# Patient Record
Sex: MALE
[De-identification: names, ages, dates, MRNs, and addresses within clinical notes are randomized per-mention and may not be internally consistent; named-entity substitution may affect disease eponyms.]

---

## 2020-03-24 ENCOUNTER — HOSPITAL ENCOUNTER (INPATIENT)
Dept: HOSPITAL 97 - ER | Age: 20
LOS: 4 days | Discharge: ANOTHER HOSPITAL | DRG: 871 | End: 2020-03-28
Attending: INTERNAL MEDICINE | Admitting: INTERNAL MEDICINE
Payer: SELF-PAY

## 2020-03-24 VITALS — BODY MASS INDEX: 28 KG/M2

## 2020-03-24 DIAGNOSIS — J18.9: ICD-10-CM

## 2020-03-24 DIAGNOSIS — D68.9: ICD-10-CM

## 2020-03-24 DIAGNOSIS — E88.09: ICD-10-CM

## 2020-03-24 DIAGNOSIS — Z20.828: ICD-10-CM

## 2020-03-24 DIAGNOSIS — E86.1: ICD-10-CM

## 2020-03-24 DIAGNOSIS — J96.01: ICD-10-CM

## 2020-03-24 DIAGNOSIS — A41.9: Primary | ICD-10-CM

## 2020-03-24 DIAGNOSIS — D69.6: ICD-10-CM

## 2020-03-24 DIAGNOSIS — E87.8: ICD-10-CM

## 2020-03-24 DIAGNOSIS — R65.20: ICD-10-CM

## 2020-03-24 DIAGNOSIS — E87.6: ICD-10-CM

## 2020-03-24 DIAGNOSIS — E87.1: ICD-10-CM

## 2020-03-24 DIAGNOSIS — R73.9: ICD-10-CM

## 2020-03-24 DIAGNOSIS — D72.810: ICD-10-CM

## 2020-03-24 LAB
ALBUMIN SERPL BCP-MCNC: 1.9 G/DL (ref 3.4–5)
ALP SERPL-CCNC: 145 U/L (ref 45–117)
ALT SERPL W P-5'-P-CCNC: 19 U/L (ref 12–78)
AST SERPL W P-5'-P-CCNC: 30 U/L (ref 15–37)
BUN BLD-MCNC: 19 MG/DL (ref 7–18)
GIANT PLATELETS BLD QL SMEAR: PRESENT
GLUCOSE SERPLBLD-MCNC: 145 MG/DL (ref 74–106)
HCT VFR BLD CALC: 38.9 % (ref 39.6–49)
LIPASE SERPL-CCNC: 52 U/L (ref 73–393)
LYMPHOCYTES # SPEC AUTO: 0.9 K/UL (ref 0.7–4.9)
MORPHOLOGY BLD-IMP: (no result)
PMV BLD: 13.6 FL (ref 7.6–11.3)
POTASSIUM SERPL-SCNC: 3 MMOL/L (ref 3.5–5.1)
RBC # BLD: 4.57 M/UL (ref 4.33–5.43)
TOXIC GRANULES BLD QL SMEAR: (no result)

## 2020-03-24 PROCEDURE — 82248 BILIRUBIN DIRECT: CPT

## 2020-03-24 PROCEDURE — 85610 PROTHROMBIN TIME: CPT

## 2020-03-24 PROCEDURE — 80307 DRUG TEST PRSMV CHEM ANLYZR: CPT

## 2020-03-24 PROCEDURE — 93307 TTE W/O DOPPLER COMPLETE: CPT

## 2020-03-24 PROCEDURE — 94640 AIRWAY INHALATION TREATMENT: CPT

## 2020-03-24 PROCEDURE — 81001 URINALYSIS AUTO W/SCOPE: CPT

## 2020-03-24 PROCEDURE — 85044 MANUAL RETICULOCYTE COUNT: CPT

## 2020-03-24 PROCEDURE — 83615 LACTATE (LD) (LDH) ENZYME: CPT

## 2020-03-24 PROCEDURE — 71045 X-RAY EXAM CHEST 1 VIEW: CPT

## 2020-03-24 PROCEDURE — 96374 THER/PROPH/DIAG INJ IV PUSH: CPT

## 2020-03-24 PROCEDURE — 87804 INFLUENZA ASSAY W/OPTIC: CPT

## 2020-03-24 PROCEDURE — 80061 LIPID PANEL: CPT

## 2020-03-24 PROCEDURE — 83690 ASSAY OF LIPASE: CPT

## 2020-03-24 PROCEDURE — 84145 PROCALCITONIN (PCT): CPT

## 2020-03-24 PROCEDURE — 85379 FIBRIN DEGRADATION QUANT: CPT

## 2020-03-24 PROCEDURE — 36415 COLL VENOUS BLD VENIPUNCTURE: CPT

## 2020-03-24 PROCEDURE — 83735 ASSAY OF MAGNESIUM: CPT

## 2020-03-24 PROCEDURE — 87070 CULTURE OTHR SPECIMN AEROBIC: CPT

## 2020-03-24 PROCEDURE — 85730 THROMBOPLASTIN TIME PARTIAL: CPT

## 2020-03-24 PROCEDURE — 71275 CT ANGIOGRAPHY CHEST: CPT

## 2020-03-24 PROCEDURE — 87389 HIV-1 AG W/HIV-1&-2 AB AG IA: CPT

## 2020-03-24 PROCEDURE — 85025 COMPLETE CBC W/AUTO DIFF WBC: CPT

## 2020-03-24 PROCEDURE — 87086 URINE CULTURE/COLONY COUNT: CPT

## 2020-03-24 PROCEDURE — 80053 COMPREHEN METABOLIC PANEL: CPT

## 2020-03-24 PROCEDURE — 87088 URINE BACTERIA CULTURE: CPT

## 2020-03-24 PROCEDURE — 87205 SMEAR GRAM STAIN: CPT

## 2020-03-24 PROCEDURE — 96361 HYDRATE IV INFUSION ADD-ON: CPT

## 2020-03-24 PROCEDURE — 87081 CULTURE SCREEN ONLY: CPT

## 2020-03-24 PROCEDURE — 94660 CPAP INITIATION&MGMT: CPT

## 2020-03-24 PROCEDURE — 84132 ASSAY OF SERUM POTASSIUM: CPT

## 2020-03-24 PROCEDURE — 94760 N-INVAS EAR/PLS OXIMETRY 1: CPT

## 2020-03-24 PROCEDURE — 83605 ASSAY OF LACTIC ACID: CPT

## 2020-03-24 PROCEDURE — 74177 CT ABD & PELVIS W/CONTRAST: CPT

## 2020-03-24 PROCEDURE — 71260 CT THORAX DX C+: CPT

## 2020-03-24 PROCEDURE — 99285 EMERGENCY DEPT VISIT HI MDM: CPT

## 2020-03-24 PROCEDURE — 87040 BLOOD CULTURE FOR BACTERIA: CPT

## 2020-03-24 PROCEDURE — 85384 FIBRINOGEN ACTIVITY: CPT

## 2020-03-24 PROCEDURE — 80202 ASSAY OF VANCOMYCIN: CPT

## 2020-03-24 RX ADMIN — SODIUM CHLORIDE SCH MLS: 0.9 INJECTION, SOLUTION INTRAVENOUS at 18:58

## 2020-03-24 RX ADMIN — ACETAMINOPHEN PRN MG: 325 TABLET ORAL at 23:45

## 2020-03-24 RX ADMIN — GUAIFENESIN AND CODEINE PHOSPHATE PRN ML: 100; 10 SOLUTION ORAL at 23:21

## 2020-03-24 NOTE — RAD REPORT
EXAM DESCRIPTION:  Terra Single View3/24/2020 12:57 pm

 

CLINICAL HISTORY:  Chest pain

 

COMPARISON:  none

 

FINDINGS:   Mild to moderate bilateral patchy lung opacities are present.

 

The heart is normal size

 

IMPRESSION:  Mild to moderate bilateral patchy lung opacities may indicate a viral pneumonia.

## 2020-03-24 NOTE — P.HP
Patient History


Date of Service: 03/24/20


Reason for admission: Shortness of breath


History of Present Illness: 





Mr Grimes is 20-year-old male who presented to the ER with complaints of 

shortness of breath and cough.  Patient reported that he tested influenza B 

positive 7 days ago.  His symptoms started 7 days ago with subjective fever, 

nonproductive cough, nausea, vomiting and diarrhea.  He denied any abdominal 

pain. Patient progressively worsened and unable to tolerate p.o. intake.


He denies any sick contacts or contact with COVID19+ person nor recent travel.


Up on presentation to the hospital, patient was tachypneic and tachycardic; he 

has been resuscitated appropriately and currently in no obvious acute distress.


Allergies





No Known Allergies Allergy (Unverified 03/24/20 18:45)


 





Home Medications: 








NK [No Home Meds]  03/24/20 








- Past Medical/Surgical History


Has patient received pneumonia vaccine in the past: No


Diabetic: No


Past Medical History: Patient denies medical history


Past Surgical History: Patient denies surgical history





- Social History


Smoking Status: Never smoker


Smoking therapy provided: No


Alcohol use: No


CD- Drugs: No


Caffeine use: No





Review of Systems


General: Fever, Chills, Sweats, Weakness, Malaise


Respiratory: Cough, Shortness of Breath, SOB with Excertion, Pleuritic Pain, 

Wheezing


Cardiovascular: Chest Pain


Gastrointestinal: Nausea, Vomiting, Diarrhea


Genitourinary: Unremarkable


Musculoskeletal: Unremarkable


Integumentary: Unremarkable


Neurological: Unremarkable


Lymphatics: Unremarkable





Physical Examination





- Physical Exam


General: Alert, In no apparent distress


HEENT: Atraumatic, PERRLA, Mucous membr. moist/pink, EOMI, Sclerae nonicteric


Neck: Supple, 2+ carotid pulse no bruit, No LAD, Without JVD or thyroid 

abnormality


Respiratory: Diminished, Crackles/rales


Cardiovascular: Regular rate/rhythm, Normal S1 S2


Gastrointestinal: Normal bowel sounds, No tenderness


Musculoskeletal: No tenderness


Integumentary: No rashes


Neurological: Normal gait, Normal speech, Normal strength at 5/5 x4 extr, 

Normal tone, Normal affect


Lymphatics: No axilla or inguinal lymphadenopathy





- Studies


Laboratory Data (last 24 hrs)





03/24/20 12:30: Sodium 133 L, Potassium 3.0 L, BUN 19 H, Creatinine 1.06, 

Glucose 145 H, Total Bilirubin 2.3 H, AST 30, ALT 19, Alkaline Phosphatase 145 H

, Lipase 52 L


03/24/20 12:30: WBC 23.9 H*, Hgb 13.3 L, Hct 38.9 L, Plt Count 33 L*





Microbiology Data (last 24 hrs): 








03/24/20 13:26   Throat   Group A Streptococcus Rapid Screen - Final


03/24/20 13:26   Nasopharnyx   Influenza Type A Antigen Screen - Final


03/24/20 13:26   Nasopharnyx   Influenza Type B Antigen Screen - Final








Assessment and Plan


Discharge Plan: Home





- Advance Directives


Does patient have a Living Will: No


Does patient have a Durable POA for Healthcare: No


Physician Review: Patient Assessed, Agree with Above Assessment and Plan


Physician Review Additional Text: 


Mr. Grimes is 21 y/o male pw cough and sob.


#Severe sepsis 2/2 to viral pneumonia- POA.  patient meeting severe sepsis 

criteria. Maintaining BP and MAP > 65


- Tbili & procal elevated. Lactic acid wnl. 


- IV hydration. Maintain perfusion


-supportive measures. 


#Viral pneumonia- CXR with bilateral infiltrates consistent with viral 

pneumonia. He was hypoxic and in resp distress on presentation. Also with GI 

symptoms, all consistent with viral etiology.


-COVID19 suspected, patient in isolation.  Swab has been sent.


-Influenza negative.


-supportive measures have been initiated.


-consult Infectious Disease and pulmonologist for assistance.


#Shortness of breath-on nasal cannula with saturations greater than 90% 


-low threshold for intubation.  


-monitor closely.


-serial CXR


#Thrombocytopenia and lymphopenia-all consistent with viral etiology.


-supportive measures and monitor for bleeding.


#Electrolyte imbalance-hypokalemia and hypo natremia secondary to hypovolemia.


-will replace.  


-monitor closely with daily labs.


#Hyperglycemia-secondary to sepsis.  Patient denies prior history of diabetes.


#Vomiting and diarrhea-last episode was yesterday.  Seems to have resolved.


-clear liquid diet until tolerating well. 


DVT ppx- SCD


Patient is full code. 


Dispo- inpatient

## 2020-03-24 NOTE — EDPHYS
Physician Documentation                                                                           

 [facility]                                                                 

Name: Armando Grimes                                                                                   

Age: 20 yrs                                                                                       

Sex: Male                                                                                         

: 2000                                                                                   

MRN: [id number]                                                                                   

Arrival Date: 2020                                                                          

Time: 12:32                                                                                       

Account#: [id number]                                                                            

Bed 16                                                                                            

Private MD:                                                                                       

ED Physician Rios Carroll                                                                    

HPI:                                                                                              

                                                                                             

15:03 This 20 yrs old  Male presents to ER via EMS with complaints of cough.          ma2 

15:03 he states he tested positive for flu B 7 days ago, he is here with cough and tachypnea  ma2 

      and tachycardia x 3 days . Onset: The symptoms/episode began/occurred gradually, 1          

      week(s) ago. Severity of symptoms: At their worst the symptoms were moderate in the         

      emergency department the symptoms are unchanged.                                            

                                                                                                  

Historical:                                                                                       

- Allergies:                                                                                      

12:39 No Known Allergies;                                                                     wh  

- Home Meds:                                                                                      

12:39 None [Active];                                                                          wh  

- PMHx:                                                                                           

12:39 None;                                                                                   wh  

- PSHx:                                                                                           

12:39 None;                                                                                   wh  

                                                                                                  

- Immunization history:: Adult Immunizations not up to date.                                      

- Social history:: Smoking status: Patient/guardian denies using Patient/guardian                 

  denies using alcohol, street drugs, The patient lives with family.                              

- Family history:: not pertinent.                                                                 

                                                                                                  

                                                                                                  

ROS:                                                                                              

15:03 Constitutional: Negative for fever, chills, and weight loss.                            ma2 

15:03 All other systems are negative.                                                             

                                                                                                  

Exam:                                                                                             

15:03 Constitutional:  This is a well developed, well nourished patient who is awake, alert,  ma2 

      and in no acute distress. Head/Face:  Normocephalic, atraumatic. Eyes:  Pupils equal        

      round and reactive to light, extra-ocular motions intact.  Lids and lashes normal.          

      Conjunctiva and sclera are non-icteric and not injected.  Cornea within normal limits.      

      Periorbital areas with no swelling, redness, or edema. ENT:  Nares patent. No nasal         

      discharge, no septal abnormalities noted.  Tympanic membranes are normal and external       

      auditory canals are clear.  Oropharynx with no redness, swelling, or masses, exudates,      

      or evidence of obstruction, uvula midline.  Mucous membranes moist. Neck:  Trachea          

      midline, no thyromegaly or masses palpated, and no cervical lymphadenopathy.  Supple,       

      full range of motion without nuchal rigidity, or vertebral point tenderness.  No            

      Meningismus. Chest/axilla:  Normal chest wall appearance and motion.  Nontender with no     

      deformity.  No lesions are appreciated. Cardiovascular:  Regular rate and rhythm with a     

      normal S1 and S2.  No gallops, murmurs, or rubs.  Normal PMI, no JVD.  No pulse             

      deficits. Abdomen/GI:  Soft, non-tender, with normal bowel sounds.  No distension or        

      tympany.  No guarding or rebound.  No evidence of tenderness throughout. Male :           

      Normal genitalia with no discharge or lesions. Skin:  Warm, dry with normal turgor.         

      Normal color with no rashes, no lesions, and no evidence of cellulitis. MS/ Extremity:      

      Pulses equal, no cyanosis.  Neurovascular intact.  Full, normal range of motion. Neuro:     

       Awake and alert, GCS 15, oriented to person, place, time, and situation.  Cranial          

      nerves II-XII grossly intact.  Motor strength 5/5 in all extremities.  Sensory grossly      

      intact.  Cerebellar exam normal.  Normal gait.                                              

15:03 Respiratory: moderate respiratory distress is noted,  Respirations: labored breathing,      

      Breath sounds: bronchial sounds, Respiratory rate:  40                                      

                                                                                                  

Vital Signs:                                                                                      

12:36  / 69; Pulse 142; Resp 32; Temp 101; Pulse Ox 95% ; Weight 72.57 kg; Height 5 ft.   

      7 in. (170.18 cm);                                                                          

13:00 BP 98 / 55; Pulse 119; Resp 34; Pulse Ox 95% on R/A;                                      

14:00  / 73; Pulse 111; Resp 41; Pulse Ox 97% on R/A;                                     

15:00  / 69; Pulse 105; Resp 36; Temp 98.3; Pulse Ox 97% ;                                

16:00  / 66; Pulse 106; Resp 35; Pulse Ox 98% ;                                         ll1 

16:55  / 69; Pulse 106; Resp 34; Pulse Ox 100% on 2 lpm NC;                             ll1 

17:25  / 72; Pulse 100; Resp 30; Pulse Ox 99% on 2 lpm NC; Pain 0/10;                   ll1 

18:20  / 62; Pulse 105; Resp 30; Temp 97.0; Pulse Ox 98% on 2 lpm NC; Pain 0/10;        ll1 

12:36 Body Mass Index 25.06 (72.57 kg, 170.18 cm)                                               

                                                                                                  

MDM:                                                                                              

12:35 Patient medically screened.                                                             ma2 

15:03 Differential Diagnosis altered mental status, sepsis, flu. Differential Diagnosis       ma2 

      altered mental status, OVID 19 vs flu vs pneumonia . Data reviewed: vital signs, nurses     

      notes. Counseling: I had a detailed discussion with the patient and/or guardian             

      regarding: the historical points, exam findings, and any diagnostic results supporting      

      the discharge/admit diagnosis, the presence of at least one elevated blood pressure         

      reading (>120/80) during this emergency department visit, the need for further work-up      

      and treatment in the hospital. Response to treatment: the patient's symptoms have           

      markedly improved after treatment.                                                          

                                                                                                  

                                                                                             

12:34 Order name: CBC with Diff; Complete Time: 14:10                                         [facility] 

                                                                                             

12:34 Order name: CMP; Complete Time: 13:09                                                   [facility] 

                                                                                             

12:34 Order name: Lipase; Complete Time: 13:09                                                [facility] 

                                                                                             

13:13 Order name: Procalcitonin; Complete Time: 14:43                                         [facility] 

                                                                                             

13:13 Order name: Blood Culture Adult (2)                                                     [facility] 

                                                                                             

13:13 Order name: Flu; Complete Time: 14:59                                                   [facility] 

                                                                                             

13:13 Order name: Strep; Complete Time: 14:59                                                 [facility] 

                                                                                             

13:50 Order name: Misc. Lab Test                                                              ss  

                                                                                             

13:54 Order name: Lactate: recollect lactate; Complete Time: 14:42                            bd  

                                                                                             

14:04 Order name: Manual Differential; Complete Time: 14:10                                   [facility]

                                                                                             

15:29 Order name: Throat Culture                                                              [facility]

                                                                                             

15:51 Order name: CBC with Automated Diff                                                     [facility]

                                                                                             

15:51 Order name: CBC with Automated Diff                                                     [facility]

                                                                                             

12:34 Order name: Chest Single View XRAY; Complete Time: 13:29                                [facility] 

                                                                                             

15:51 Order name: CBC with Automated Diff                                                     [facility]

                                                                                             

15:51 Order name: CBC with Automated Diff                                                     [facility]

                                                                                             

15:51 Order name: Comprehensive Metabolic Panel                                               [facility]

                                                                                             

15:51 Order name: Comprehensive Metabolic Panel                                               [facility]

                                                                                             

15:51 Order name: Comprehensive Metabolic Panel                                               [facility]

                                                                                             

15:51 Order name: Comprehensive Metabolic Panel                                               [facility]

                                                                                             

15:51 Order name: Lipid Profile                                                               [facility]

                                                                                             

15:51 Order name: Lipid Profile                                                               [facility]

                                                                                             

15:51 Order name: CONS Pharmacy Consult                                                       [facility]

                                                                                             

16:50 Order name: Clear Liquid                                                                [facility]

                                                                                                  

Administered Medications:                                                                         

13:20 Drug: NS 0.9% 2000 ml Route: IV; Rate: 1 bolus; Site: left antecubital;                   

14:00 Follow up: Response: No adverse reaction; IV Status: Completed infusion; IV Intake:     ll1 

      2000ml                                                                                      

13:30 CANCELLED (Patient ): AZITHromycin 500 mg PO once                                ma2 

13:30 Drug: TORadol 30 mg Route: IVP; Site: left antecubital;                                   

13:30 Drug: AZITHromycin 500 mg Route: PO;                                                      

18:34 Follow up: Response: No adverse reaction; RASS: Alert and Calm (0)                      ll1 

13:50 Not Given (Other Intervention Used): covid 1 application PO bolus                       ss  

15:01 Not Given (Physician Discretion): AZITHromycin 500 mg IVPB once over 1 hrs; (mix in 250 wh  

      mL NS)                                                                                      

15:33 Drug: Tylenol 1000 mg Route: PO;                                                        ll1 

18:35 Follow up: Response: No adverse reaction; Temperature is decreased; Pain is decreased;  ll1 

      RASS: Alert and Calm (0)                                                                    

                                                                                                  

                                                                                                  

Disposition:                                                                                      

20 15:06 Hospitalization ordered by Donnell Gill for Inpatient Admission. Preliminary     

  diagnosis are Severe sepsis without septic shock, Viral pneumonia, not                          

  elsewhere classified.                                                                           

- Bed requested for Telemetry/MedSurg (Inpatient).                                                

- Status is Inpatient Admission.                                                              ll1 

- Condition is Fair.                                                                              

- Problem is new.                                                                                 

- Symptoms are unchanged.                                                                         

                                                                                                  

                                                                                                  

                                                                                                  

Signatures:                                                                                       

Dispatcher [employer]                           EDMS                                                 

Lisa Benitez                                                                                 

Alma Glass                                                                                   

Rios Carroll MD MD   ma2                                                  

Demetrius Umanzor RN                       RN   1                                                  

Unique Jesus RN   ss                                                   

                                                                                                  

Corrections: (The following items were deleted from the chart)                                    

13:30 12:34 AZITHromycin 500 mg PO once ordered. ma2                                          ma2 

16:49 15:51 Regular ordered. EDMS                                                             [facility]

17:01 13:13 LACTATE+C.LAB.BRZ ordered. EDMS                                                   [facility]

17:12 15:06 Hospitalization Ordered by Donnell Gill MD for Inpatient Admission. Preliminary  bd  

      diagnosis is Severe sepsis without septic shock; Viral pneumonia, not elsewhere             

      classified. Bed requested for Telemetry/MedSurg (Inpatient). Status is Inpatient            

      Admission. Condition is Fair. Problem is new. Symptoms are unchanged. ma2                   

18:33 17:12 2020 15:06 Hospitalization Ordered by Donnell Gill MD for Inpatient        ll1 

      Admission. Preliminary diagnosis is Severe sepsis without septic shock; Viral               

      pneumonia, not elsewhere classified. Bed requested for Telemetry/MedSurg (Inpatient).       

      Status is Inpatient Admission. Condition is Fair. Problem is new. Symptoms are              

      unchanged. bd                                                                               

                                                                                                  

**************************************************************************************************

## 2020-03-24 NOTE — ER
Nurse's Notes                                                                                     

 [facility]                                                                 

Name: Armando Grimes                                                                                   

Age: 20 yrs                                                                                       

Sex: Male                                                                                         

: 2000                                                                                   

MRN: [id number]                                                                                   

Arrival Date: 2020                                                                          

Time: 12:32                                                                                       

Account#: [id number]                                                                            

Bed 16                                                                                            

Private MD:                                                                                       

Diagnosis: Severe sepsis without septic shock;Viral pneumonia, not elsewhere classified           

                                                                                                  

Presentation:                                                                                     

                                                                                             

12:30 Onset of symptoms is unknown.                                                             

12:36 Chief complaint: EMS states: Pt was diagnosed with the flu a week ago. Pt only taking   wh  

      Nyquil states he is not getting better. Pt fever 102.3 and Tachycardic at 145. Pt           

      states was tested for [facility] in [facility] [place]. Coronavirus screen:       

      Patient reports a subjective fever or greater than 100.4F, or cough, or shortness of        

      breath, or difficulty breathing. Surgical mask placed on patient. Patient moved to          

      private room, placed in contact and droplet isolation with eye protection until further     

      assessment. Patient denies travel on a cruise ship or to a country the [facility] currently        

      lists as an affected area. Patient denies contact with known and/or suspected case of       

      COVID-19. Ebola Screen: Patient negative for fever greater than or equal to 101.5           

      degrees Fahrenheit, and additional compatible Ebola Virus Disease symptoms Patient          

      denies exposure to infectious person. Initial Sepsis Screen: Does the patient meet any      

      2 criteria? RR > 20 per min. Temp <36.0*C (96.8*F)) or > 38.3*C (100.9*F). HR > 90 bpm.     

      Does the patient have a suspected source of infection? Yes: Other: Flu If YES to both,      

      name of provider notified: Rios Carroll MD. Risk Assessment: Do you want to hurt        

      yourself or someone else? Patient reports no desire to harm self or others.                 

12:36 Method Of Arrival: EMS: [facility]                                                  

12:36 Acuity: SURENDRA 3                                                                             

                                                                                                  

Historical:                                                                                       

- Allergies:                                                                                      

12:39 No Known Allergies;                                                                       

- Home Meds:                                                                                      

12:39 None [Active];                                                                            

- PMHx:                                                                                           

12:39 None;                                                                                     

- PSHx:                                                                                           

12:39 None;                                                                                     

                                                                                                  

- Immunization history:: Adult Immunizations not up to date.                                      

- Social history:: Smoking status: Patient/guardian denies using Patient/guardian                 

  denies using alcohol, street drugs, The patient lives with family.                              

- Family history:: not pertinent.                                                                 

                                                                                                  

                                                                                                  

Screenin:39 Abuse screen: Denies threats or abuse. Denies injuries from another. Nutritional          

      screening: No deficits noted. Tuberculosis screening: No symptoms or risk factors           

      identified. Fall Risk None identified.                                                      

                                                                                                  

Assessment:                                                                                       

12:45 Reassessment: Pt flagged for Sepsis , RR 32, Temp 101, MD at bedside, notified of   

      Sepsis Protocol, MD stated no need for Septic Work up now.                                  

13:00 General: Appears in no apparent distress. Behavior is calm, cooperative, appropriate    wh  

      for age. Pain: Denies pain. Neuro: Level of Consciousness is awake, alert, obeys            

      commands, Oriented to person, place, time, situation, Appropriate for age.                  

      Cardiovascular: Heart tones S1 S2. Respiratory: Reports shortness of breath cough that      

      is Airway is patent Respiratory effort is even, labored, shallow, Respiratory pattern       

      is tachypnea Breath sounds are diminished bilaterally. GI: Abdomen is flat,                 

      non-distended. : No signs and/or symptoms were reported regarding the genitourinary       

      system. EENT: Throat is pink. Derm: Skin is intact, is healthy with good turgor, Skin       

      is pink, warm \T\ dry. normal. Musculoskeletal: Circulation, motion, and sensation intact.  

13:10 Reassessment: Spoke with MD septic work up was ordered, Pt still tachypneic.              

14:10 Reassessment: Pt still tachypneic, shallow breaths at 44, lung sounds diminished, spoke wh  

      with MD, assessment done with order to put Pt on 2LNC.                                      

15:19 Reassessment: No changes from previously documented assessment. Patient and/or family   ll1 

      updated on plan of care and expected duration. Pain level reassessed. Patient is alert,     

      oriented x 3, equal unlabored respirations, skin warm/dry/pink. To bedside commode for      

      BM. Call light within reach..                                                               

16:15 Reassessment: No changes from previously documented assessment. Patient and/or family   ll1 

      updated on plan of care and expected duration. Pain level reassessed. Patient is alert,     

      oriented x 3, equal unlabored respirations, skin warm/dry/pink.                             

16:33 Reassessment: PUI [id number].                                                              

17:15 Reassessment: No changes from previously documented assessment. Patient and/or family   ll1 

      updated on plan of care and expected duration. Pain level reassessed. Patient is alert,     

      oriented x 3, equal unlabored respirations, skin warm/dry/pink.                             

                                                                                                  

Vital Signs:                                                                                      

12:36  / 69; Pulse 142; Resp 32; Temp 101; Pulse Ox 95% ; Weight 72.57 kg; Height 5 ft.   

      7 in. (170.18 cm);                                                                          

13:00 BP 98 / 55; Pulse 119; Resp 34; Pulse Ox 95% on R/A;                                      

14:00  / 73; Pulse 111; Resp 41; Pulse Ox 97% on R/A;                                     

15:00  / 69; Pulse 105; Resp 36; Temp 98.3; Pulse Ox 97% ;                                

16:00  / 66; Pulse 106; Resp 35; Pulse Ox 98% ;                                         ll1 

16:55  / 69; Pulse 106; Resp 34; Pulse Ox 100% on 2 lpm NC;                             ll1 

17:25  / 72; Pulse 100; Resp 30; Pulse Ox 99% on 2 lpm NC; Pain 0/10;                   ll1 

18:20  / 62; Pulse 105; Resp 30; Temp 97.0; Pulse Ox 98% on 2 lpm NC; Pain 0/10;        ll1 

12:36 Body Mass Index 25.06 (72.57 kg, 170.18 cm)                                               

                                                                                                  

ED Course:                                                                                        

12:30 Maintain EMS IV. Dressing intact. Good blood return noted. Site clean \T\ dry. Gauge \T\    kai
3

      site: 20-gauge in RAC. Patient maintains SpO2 saturation greater than 95% on room air.      

12:30 Initial lab(s) drawn, by me, sent to lab.                                               jp3 

12:30 Patient has correct armband on for positive identification. Bed in low position. Call   jp3 

      light in reach. Cool cloth applied. Verbal reassurance given. Pulse ox on. NIBP on.         

12:30 Arm band placed on right wrist.                                                           

12:32 Patient arrived in ED.                                                                    

12:33 Rios Carroll MD is Attending Physician.                                           ma2 

12:35 Alma Glass is Primary Nurse.                                                           

12:38 Triage completed.                                                                       wh  

12:43 Lipase Sent.                                                                            jp3 

12:44 CMP Sent.                                                                               jp3 

12:44 CBC with Diff Sent.                                                                     jp3 

12:57 Chest Single View XRAY In Process Unspecified.                                          EDMS

13:20 Inserted saline lock: 20 gauge in left antecubital area, using aseptic technique. Blood wh  

      collected.                                                                                  

15:05 Donnell Gill MD is Hospitalizing Provider.                                           ma2 

17:35 No provider procedures requiring assistance completed. Patient admitted, IV remains in  ll1 

      place.                                                                                      

                                                                                                  

Administered Medications:                                                                         

13:20 Drug: NS 0.9% 2000 ml Route: IV; Rate: 1 bolus; Site: left antecubital;                   

14:00 Follow up: Response: No adverse reaction; IV Status: Completed infusion; IV Intake:     ll1 

      2000ml                                                                                      

13:30 CANCELLED (Patient ): AZITHromycin 500 mg PO once                                ma2 

13:30 Drug: TORadol 30 mg Route: IVP; Site: left antecubital;                                   

13:30 Drug: AZITHromycin 500 mg Route: PO;                                                      

18:34 Follow up: Response: No adverse reaction; RASS: Alert and Calm (0)                      [facility] 

13:50 Not Given (Other Intervention Used): covid 1 application PO bolus                         

15:01 Not Given (Physician Discretion): AZITHromycin 500 mg IVPB once over 1 hrs; (mix in 250 wh  

      mL NS)                                                                                      

15:33 Drug: Tylenol 1000 mg Route: PO;                                                        1 

18:35 Follow up: Response: No adverse reaction; Temperature is decreased; Pain is decreased;  1 

      RASS: Alert and Calm (0)                                                                    

                                                                                                  

                                                                                                  

Intake:                                                                                           

14:00 IV: 2000ml; Total: 2000ml.                                                              1 

                                                                                                  

Outcome:                                                                                          

15:06 Decision to Hospitalize by Provider.                                                    ma2 

17:33 Admitted to Tele accompanied by tech, via stretcher, room Room 417, Report called to    [facility] 

      Jaylene CABELLO                                                                                     

17:33 Condition: improved                                                                         

17:33 Instructed on the need for admit.                                                           

18:33 Patient left the ED.                                                                    1 

                                                                                                  

Signatures:                                                                                       

Dispatcher [employer]                           EDMS                                                 

Unique Jesus RN                      RN                                                      

Alma Glass                                                                                   

Rios Carroll MD MD   ma2                                                  

Jhonatan Park                              3                                                  

Demetrius Umanzor RN                       RN   1                                                  

                                                                                                  

Corrections: (The following items were deleted from the chart)                                    

14:58 12:36  / 69; Pulse 142bpm; Resp 22bpm; Pulse Ox 95%; Temp 101F; 72.57 kg; Height    

      5 ft. 7 in.; BMI: 25.0; wh                                                                  

14:58 12:33 Chief complaint: [facility]  

                                                                                                  

**************************************************************************************************

## 2020-03-25 LAB
ALBUMIN SERPL BCP-MCNC: 1.7 G/DL (ref 3.4–5)
ALP SERPL-CCNC: 115 U/L (ref 45–117)
ALT SERPL W P-5'-P-CCNC: 18 U/L (ref 12–78)
AST SERPL W P-5'-P-CCNC: 31 U/L (ref 15–37)
BUN BLD-MCNC: 18 MG/DL (ref 7–18)
GLUCOSE SERPLBLD-MCNC: 109 MG/DL (ref 74–106)
HCT VFR BLD CALC: 33.3 % (ref 39.6–49)
HDLC SERPL-MCNC: 11 MG/DL (ref 40–60)
INR BLD: 1.77
LDLC SERPL CALC-MCNC: 13 MG/DL (ref ?–130)
LYMPHOCYTES # SPEC AUTO: 0.8 K/UL (ref 0.7–4.9)
PMV BLD: 13.7 FL (ref 7.6–11.3)
POTASSIUM SERPL-SCNC: 3.3 MMOL/L (ref 3.5–5.1)
RBC # BLD: 3.89 M/UL (ref 4.33–5.43)
UA COMPLETE W REFLEX CULTURE PNL UR: (no result)

## 2020-03-25 RX ADMIN — SODIUM CHLORIDE SCH: 0.9 INJECTION, SOLUTION INTRAVENOUS at 13:00

## 2020-03-25 RX ADMIN — SODIUM CHLORIDE SCH MLS: 0.9 INJECTION, SOLUTION INTRAVENOUS at 04:49

## 2020-03-25 RX ADMIN — CEFTRIAXONE SCH MLS: 1 INJECTION, SOLUTION INTRAVENOUS at 08:36

## 2020-03-25 RX ADMIN — CEFTRIAXONE SCH MLS: 1 INJECTION, SOLUTION INTRAVENOUS at 20:30

## 2020-03-25 RX ADMIN — ACETAMINOPHEN SCH MG: 325 TABLET ORAL at 20:30

## 2020-03-25 RX ADMIN — GUAIFENESIN AND CODEINE PHOSPHATE PRN ML: 100; 10 SOLUTION ORAL at 08:50

## 2020-03-25 RX ADMIN — TRAMADOL HYDROCHLORIDE PRN MG: 50 TABLET, COATED ORAL at 10:45

## 2020-03-25 RX ADMIN — ACETAMINOPHEN PRN MG: 325 TABLET ORAL at 04:49

## 2020-03-25 RX ADMIN — TRAMADOL HYDROCHLORIDE PRN MG: 50 TABLET, COATED ORAL at 20:48

## 2020-03-25 RX ADMIN — SODIUM CHLORIDE SCH MLS: 9 INJECTION, SOLUTION INTRAVENOUS at 16:06

## 2020-03-25 RX ADMIN — SODIUM CHLORIDE SCH MLS: 0.9 INJECTION, SOLUTION INTRAVENOUS at 08:37

## 2020-03-25 RX ADMIN — ACETAMINOPHEN PRN MG: 325 TABLET ORAL at 16:05

## 2020-03-25 NOTE — CON
History Of Present Illness:  This is a 20-year-old male who has been having problem with breathing fo
r last 2 weeks, coming in with severe shortness of breath, shivers, chills, and fevers.  Patient has 
a strep.  Patient has gram-positive cocci positive in his blood cultures.  Patient continued to have 
productive cough with yellowish sputum and fever as high as 102.  The patient also has nausea and vom
iting.  Denies any other problems at this time except for chest pain especially when he takes deep br
eaths.



Past Medical History:  None.



Family History:  Noncontributory.



Medications:  Zithromax, Rocephin, and vancomycin has been recently added.  See MAR for other medicat
ions.



Allergies:  NO KNOWN DRUG ALLERGIES.



Review of Systems:

A 10-point review was performed.



Physical Examination:

General:  This is a 20-year-old male, lying in bed with mild respiratory distress. 

Vital Signs:  T-max of 102, right now 100.2; heart rate of 115; respirations 36; and blood pressure 1
24/66. 

HEENT:  Unremarkable. 

Neck:  Supple. 

Lungs:  Basal crackles and scattered rhonchi on the left side. 

Heart:  S1, S2.  Regular. 

Abdomen:  Soft, nontender.  Bowel sounds present. 

Extremities:  No edema __________ muscle tone.



Laboratory Data:  Shows WBC 21,900, hemoglobin 11.3, platelets 43.  Sodium 136, potassium 3.3, chlori
de 105, bicarb 23, BUN 18, creatinine 0.8, glucose is 109, albumin is 1.7.  Procalcitonin is 50.  Jeffrey
ro data is blood cultures are growing gram-positive cocci in anaerobic.  Serum cultures are pending. 
 Rapid strep negative.  Influenza A and B antigen are negative.



Assessment And Plan:  This is a 20-year-old male with fever, shortness of breath and chest discomfort
.  Blood cultures are being positive for gram-positive cocci.  CT scan shows patient has no embolus, 
but bilateral nodular opacities and a small area of consolidation.  This is nonspecific and does not 
have a classic viral pneumonia appearance.  Continue empiric antibiotic with vancomycin, Rocephin, an
d Zithromax pending culture results.  Consider transferring the patient to the ICU for next 24 hours 
for close observation and monitoring of vital signs.  We will follow the patient closely.  



Thank you for consult.





NF/MODL

DD:  03/25/2020 13:32:27Voice ID:  [id number]

DT:  03/25/2020 14:01:19Report ID:  [id number]

## 2020-03-25 NOTE — RAD REPORT
EXAM DESCRIPTION:  CT - Chest For Pe Angio - 3/25/2020 11:10 am

 

CLINICAL HISTORY:   Chest pain/elevated D-dimer

 

COMPARISON:  None.

 

TECHNIQUE:  Dynamically enhanced axial 3 mm thick images of the chest were obtained during administra
tion of <100> mL Isovue 370 IV contrast. Coronal and oblique reconstruction images were generated and
 reviewed. Exam utilizes a protocol for optimal evaluation of pulmonary arterial tree.

 

Maximum intensity projections 3D imaging was utilized

 

All CT scans are performed using dose optimization technique as appropriate and may include automated
 exposure control or mA/KV adjustment according to patient size.

 

FINDINGS:  The opacification of the pulmonary arteries is suboptimal. A central pulmonary embolus is 
not seen.

 

A thoracic aortic aneurysm is not noted.

 

Small bilateral pleural effusions with lower lobe atelectasis.

 

Bilateral nodular opacities measuring up to 18 millimeters. Small bilateral areas consolidation

 

IMPRESSION:  No gross evidence of a pulmonary embolus

 

Bilateral nodular opacities and small areas of consolidation. This is nonspecific. It does not have a
 classic viral pneumonia appearance. Other considerations include bacterial and fungal infection as w
ell as septic emboli

## 2020-03-25 NOTE — P.PN
Subjective


Date of Service: 03/25/20


Chief Complaint: Pneumonia


Subjective: Worsening (SOB; Fever)





Physical Examination





- Vital Signs


Temperature: 100.2 F


Blood Pressure: 124/66


Pulse: 115


Respirations: 36


Pulse Ox (%): 94





- Physical Exam


General: Alert, In no apparent distress, Oriented x3


HEENT: Atraumatic, PERRLA, EOMI


Neck: Supple, JVD not distended


Respiratory: Diminished, Crackles/rales


Cardiovascular: Regular rate/rhythm, Normal S1 S2


Gastrointestinal: Normal bowel sounds, No tenderness


Musculoskeletal: No tenderness


Integumentary: No rashes


Neurological: Normal speech, Normal tone, Normal affect


Lymphatics: No axilla or inguinal lymphadenopathy





- Studies


Laboratory Data (last 24 hrs)





03/24/20 12:30: Sodium 133 L, Potassium 3.0 L, BUN 19 H, Creatinine 1.06, 

Glucose 145 H, Total Bilirubin 2.3 H, AST 30, ALT 19, Alkaline Phosphatase 145 H

, Lipase 52 L


03/24/20 12:30: WBC 23.9 H*, Hgb 13.3 L, Hct 38.9 L, Plt Count 33 L*





Microbiology Data (last 24 hrs): 








03/24/20 13:20   Blood  - Blood   Blood Culture Gram Stain - Final


03/24/20 13:26   Throat   Group A Streptococcus Rapid Screen - Final


03/24/20 13:26   Nasopharnyx   Influenza Type A Antigen Screen - Final


03/24/20 13:26   Nasopharnyx   Influenza Type B Antigen Screen - Final








Assessment & Plan


Physician Review: Patient Assessed, Agree with Above Assessment and Plan


Physician Review Additional Text: 


Mr. Grimes is 21 y/o male pw cough and sob.


#Severe sepsis 2/2 to pneumonia- POA.  patient meeting severe sepsis criteria. 

Maintaining BP and MAP > 65


- Tbili & procal elevated. Lactic acid wnl. 


- IV hydration. Maintain perfusion


-supportive measures. 


#Pneumonia- CXR with bilateral infiltrates consistent with viral pneumonia. CT 

chest against. With infiltrates, also concerning for septic embolic. Check TTE. 


-with bacteremia, more suggestive of bacteria. GPC in chains and clusters. 


- still febrile, will consider escalating. add vanc to cover MRSA


-COVID19 suspected, patient in isolation.  Swab has been sent.


-Influenza negative.


-supportive measures have been initiated.


-consulted Infectious Disease and pulmonologist for assistance.


#Shortness of breath-on nasal cannula with saturations greater than 90% 


-low threshold for intubation.  


-monitor closely.


-serial CXR for improvment. 


#Thrombocytopenia and lymphopenia-improving.


-supportive measures and monitor for bleeding.


#Electrolyte imbalance-hypokalemia and hyponatremia secondary to hypovolemia.


-will replace.  


-monitor closely with daily labs.


#Hyperglycemia-secondary to sepsis.  Patient denies prior history of diabetes.


#Vomiting and diarrhea-advance to full liquid. 





DVT ppx- SCD


Patient is full code. 


Dispo- inpatient

## 2020-03-25 NOTE — P.CNS
Date of Consult: 03/25/20


Reason for Consult: Pneumonia


Chief Complaint: Pneumonia


History of Present Illness: 


Patient is 20 years of age admitted with pneumonia blood cultures are positive 

also hypoxic complaining of shortness of breath.  Fever nonproductive cough 

nausea vomiting diarrhea no recent history of travel or COVID contact the 

patient is tachypneic tachycardic information obtained from records review 

COVID19 test is pending


Allergies





No Known Allergies Allergy (Unverified 03/24/20 18:45)


   





Home Medications: 








NK [No Home Meds]  03/24/20 








- Past Medical/Surgical History


Diabetic: No





- Social History


Alcohol use: No


CD- Drugs: No


Caffeine use: No





Review of Systems


is unable to be obtained





Physical Examination














Temp Pulse Resp BP Pulse Ox


 


 100.2 F   115 H  36 H  124/66   94 


 


 03/25/20 09:29  03/25/20 04:00  03/25/20 10:45  03/25/20 04:00  03/25/20 10:45








Laboratory Data (last 24 hrs)





03/24/20 12:30: Sodium 133 L, Potassium 3.0 L, BUN 19 H, Creatinine 1.06, 

Glucose 145 H, Total Bilirubin 2.3 H, AST 30, ALT 19, Alkaline Phosphatase 145 

H, Lipase 52 L


03/24/20 12:30: WBC 23.9 H*, Hgb 13.3 L, Hct 38.9 L, Plt Count 33 L*








- Problems


(1) Pneumonia


Current Visit: Yes   Status: Acute   


Plan: 


The patient is 20 years of age in new CVA community acquired pneumonia the c

ultures are positive for gram-positive cocci in pairs and chains continue with 

Rocephin continuous pulse ox the patient is D-dimer is elevated also has 

bilateral pleural effusions study unlikely that he has COVID 19 infection

## 2020-03-26 LAB
ALBUMIN SERPL BCP-MCNC: 1.5 G/DL (ref 3.4–5)
ALBUMIN SERPL BCP-MCNC: 1.6 G/DL (ref 3.4–5)
ALP SERPL-CCNC: 107 U/L (ref 45–117)
ALP SERPL-CCNC: 118 U/L (ref 45–117)
ALT SERPL W P-5'-P-CCNC: 19 U/L (ref 12–78)
ALT SERPL W P-5'-P-CCNC: 22 U/L (ref 12–78)
ANISOCYTOSIS BLD QL: (no result)
AST SERPL W P-5'-P-CCNC: 35 U/L (ref 15–37)
AST SERPL W P-5'-P-CCNC: 44 U/L (ref 15–37)
BUN BLD-MCNC: 17 MG/DL (ref 7–18)
BUN BLD-MCNC: 18 MG/DL (ref 7–18)
GLUCOSE SERPLBLD-MCNC: 105 MG/DL (ref 74–106)
GLUCOSE SERPLBLD-MCNC: 88 MG/DL (ref 74–106)
HCT VFR BLD CALC: 34.2 % (ref 39.6–49)
INR BLD: 2.09
LYMPHOCYTES # SPEC AUTO: 1.4 K/UL (ref 0.7–4.9)
METHAMPHET UR QL SCN: NEGATIVE
MORPHOLOGY BLD-IMP: (no result)
PMV BLD: 12.9 FL (ref 7.6–11.3)
POTASSIUM SERPL-SCNC: 3.7 MMOL/L (ref 3.5–5.1)
POTASSIUM SERPL-SCNC: 4 MMOL/L (ref 3.5–5.1)
RBC # BLD: 3.66 M/UL (ref 4.33–5.43)
RBC # BLD: 3.97 M/UL (ref 4.33–5.43)
TARGETS BLD QL SMEAR: (no result)
THC SERPL-MCNC: POSITIVE NG/ML
TOXIC GRANULES BLD QL SMEAR: (no result)
UA COMPLETE W REFLEX CULTURE PNL UR: (no result)

## 2020-03-26 RX ADMIN — ACETAMINOPHEN SCH MG: 325 TABLET ORAL at 08:30

## 2020-03-26 RX ADMIN — Medication PRN APPL: at 13:30

## 2020-03-26 RX ADMIN — ACETAMINOPHEN SCH MG: 325 TABLET ORAL at 00:55

## 2020-03-26 RX ADMIN — ACETAMINOPHEN SCH MG: 325 TABLET ORAL at 21:13

## 2020-03-26 RX ADMIN — TRAMADOL HYDROCHLORIDE PRN MG: 50 TABLET, COATED ORAL at 21:38

## 2020-03-26 RX ADMIN — Medication PRN APPL: at 17:30

## 2020-03-26 RX ADMIN — TRAMADOL HYDROCHLORIDE PRN MG: 50 TABLET, COATED ORAL at 14:42

## 2020-03-26 RX ADMIN — SODIUM CHLORIDE SCH MLS: 9 INJECTION, SOLUTION INTRAVENOUS at 00:55

## 2020-03-26 RX ADMIN — TRAMADOL HYDROCHLORIDE PRN MG: 50 TABLET, COATED ORAL at 05:16

## 2020-03-26 RX ADMIN — ACETAMINOPHEN SCH: 325 TABLET ORAL at 16:00

## 2020-03-26 RX ADMIN — SODIUM CHLORIDE SCH MLS: 0.9 INJECTION, SOLUTION INTRAVENOUS at 09:00

## 2020-03-26 RX ADMIN — CEFEPIME SCH MLS: 1 INJECTION, POWDER, FOR SOLUTION INTRAMUSCULAR; INTRAVENOUS at 15:00

## 2020-03-26 RX ADMIN — MORPHINE SULFATE PRN MG: 2 INJECTION, SOLUTION INTRAMUSCULAR; INTRAVENOUS at 18:37

## 2020-03-26 RX ADMIN — SODIUM CHLORIDE SCH MLS: 9 INJECTION, SOLUTION INTRAVENOUS at 14:45

## 2020-03-26 RX ADMIN — GUAIFENESIN AND CODEINE PHOSPHATE PRN ML: 100; 10 SOLUTION ORAL at 09:50

## 2020-03-26 RX ADMIN — CEFEPIME SCH MLS: 1 INJECTION, POWDER, FOR SOLUTION INTRAMUSCULAR; INTRAVENOUS at 21:13

## 2020-03-26 RX ADMIN — Medication PRN APPL: at 09:50

## 2020-03-26 RX ADMIN — ACETAMINOPHEN SCH MG: 325 TABLET ORAL at 05:00

## 2020-03-26 RX ADMIN — CEFTRIAXONE SCH MLS: 1 INJECTION, SOLUTION INTRAVENOUS at 09:00

## 2020-03-26 NOTE — RAD REPORT
EXAM DESCRIPTION:  RAD - Chest Single View - 3/26/2020 9:51 am

 

CLINICAL HISTORY:  follow uppneumonia

 

COMPARISON:  March 24 portable chest I am going to March 25 CT chest

 

TECHNIQUE:  AP portable chest image was obtained 3/26/2020 9:51 am .

 

FINDINGS:  Lung volumes are reduced compared to the portable chest examination. Bilateral lung base p
leural effusions with lower lobe infiltrate and/ or atelectasis again noted. Focal opacification in t
he left midlung field has not changed. Patchy areas of nodularity are present similar to comparison. 
No improvement since prior imaging. Trachea is midline. Heart size is normal. No measurable pleural e
ffusion and no pneumothorax. No acute bony abnormality seen. No acute aortic findings suspected.

 

IMPRESSION:  Bilateral pleural effusions with left greater than right lung base parenchymal opacifica
tion.

 

Scattered infiltrate changes are present in the mid and upper lung fields.

 

Chest examination is not substantially different from the prior day CT study.

## 2020-03-26 NOTE — P.CNS
Date of Consult: 03/26/20 (Hematology)





PHONE CONSULT





History of present illness as per Dr Gill and chart review. 


Hematology consulted for evaluation of severe thrombocytopenia and deranged 

coagulation panel. 





Patient currently in ICU undergoing treatment for gram positive septicemia and 

management of respiratory distress secondary to b/l pneumonia. 


No recent exposure to heparin products. 


No bleeding or bruising. 


No known liver disease.





Labs reviewed. 


wbc 28K


Hb 11.5g (13g)


plt 58K<---43K <----33K


toxic granulation+


giant plt+


retic 0.25%/RBC 3.66// Total bili2.2/ direct 1.4


PT 24/PTT 31/INR 2 (1.77)


fibrinogen 600





AST35/ALT19/


Alb 1.6


Procalcitonin 69


MICRO-GPC





CT: b/l nodular opacities/ small pleural effusion





PROBLEMS: 





1. Gram positive septicemia 


2. Pneumonia / ? septic emboli


3. thrombocytopenia 


4. Coagulopathy


5. Hypoalbuminemia 








Recommendations: 





Review and discussion of the labs with Dr Lang and recommendations also 

discussed over phone. 





1. Severe thrombocytopenia: Multifactorial predominantly likely from underlying 

sepsis, medications, acute stress. No evidence of hemolysis. No evidence of TTP 

as per lab review (peripheral smear pending). 


Unknown plt baseline. But counts are slowly uptrending since admission. 





No intervention at this time as patient reportedly has no bleeding or excessive 

bruising. 





- Treat underlying sepsis. 


- Transfuse single donor platelets if bleeding and plt count is less than 50,000


- Transfuse single donor platelets if plt count less than 15,000





2. Coagulopathy: Also likely due to same etiology. Likely has DIC secondary to 

underlying sepsis. (elevated PT, D dimers). Fibrinogen is adequate. 


No intervention at this time as patient reportedly has no bleeding or excessive 

bruising. 





- Transfuse FFP if bleeding or needs any procedures. 


- No indication of cryoprecipitate at this time. 


- Continue to monitor. Daily cbc, PT PTT, Fibrinogen 





3. Management of Problems 1,2,5 as per primary medical team. 





Please call Hematology on call if any concerns or change in status.

## 2020-03-26 NOTE — P.PN
Subjective


Date of Service: 03/26/20


Chief Complaint: Pneumonia





Events in last 24 hours noted. Moved to icu yesterday due worsened respiratory 

distress and hypoxia. remains on 40% ventimask. Febrile early this morning.


Denies any new complaints. 


Continues to feel weak.





Review of Systems


General: Fever, Chills, Sweats, Weakness


Respiratory: Shortness of Breath, SOB with Excertion, Pleuritic Pain





Physical Examination





- Vital Signs


Temperature: 98.8 F


Blood Pressure: 108/60


Pulse: 101


Respirations: 44


Pulse Ox (%): 97





- Physical Exam


General: Alert, Mild distress


HEENT: Atraumatic, PERRLA, EOMI


Neck: Supple, JVD not distended


Respiratory: Clear to auscultation bilaterally, Diminished (R Base < left), 

Crackles/rales


Cardiovascular: Normal S1 S2, Irregular heart rate/rhythm (Tachycardia)


Gastrointestinal: Normal bowel sounds, No tenderness, No masses, No rebound


Musculoskeletal: No tenderness


Integumentary: No rashes


Neurological: Normal speech, Normal tone, Normal affect


Lymphatics: No axilla or inguinal lymphadenopathy





- Studies





 Laboratory Tests











  03/26/20 03/26/20





  05:24 10:06


 


Sodium  138 


 


Potassium  4.0 


 


Chloride  105 


 


Carbon Dioxide  25 


 


Calcium  7.4 L 


 


Direct Bilirubin   1.4 H


 


Serum Total Protein  5.7 L 


 


Albumin  1.6 L 


 


Globulin  4.1 H 


 


Albumin/Globulin Ratio  0.4 L 








 Laboratory Tests











  03/26/20 03/26/20 03/26/20





  05:24 10:06 10:06


 


WBC  28.7 H* D  


 


RBC  3.97 L  


 


Hgb  11.5 L  


 


Hct  34.2 L  


 


RDW  15.3 H  


 


Plt Count  58 L D  


 


MPV  12.9 H  


 


Neutrophils %  86.4 H  


 


Lymphocytes %  4.7 L  


 


Absolute Neutrophils  24.8 H  


 


Segmented Neutrophils  83 H  


 


Band Neutrophils  7 H  


 


Lymphocytes  6 L  


 


Absolute Monocytes  2.4 H  


 


Absolute Retic    0.01 L


 


Percent Retic    0.25 L


 


PT   24.3 H 


 


INR   2.09 


 


APTT   31.6 











Microbiology Data (last 24 hrs): 








03/24/20 13:26   Throat   Culture & Sensitivity - Final


                            NORMAL UPPER RESPIRATORY SEVERO GROWN.


03/24/20 13:40   Blood  - Blood   Blood Culture Gram Stain - Final


03/24/20 13:20   Blood  - Blood   Blood Culture Gram Stain - Final


Microbiology





03/24/20 13:40   Blood  - Blood   Blood Culture Gram Stain - Final


03/24/20 13:20   Blood  - Blood   Blood Culture Gram Stain - Final


03/24/20 13:40   Blood  - Blood   Aerobic Blood Culture - Preliminary


03/24/20 13:40   Blood  - Blood   Anaerobic Blood Culture - Preliminary


03/24/20 13:20   Blood  - Blood   Aerobic Blood Culture - Preliminary


03/24/20 13:20   Blood  - Blood   Gram Stain - Preliminary








Imagings Data: 





IMPRESSION:  Bilateral pleural effusions with left greater than right lung base 

parenchymal opacification.


 


Scattered infiltrate changes are present in the mid and upper lung fields.


 


Chest examination is not substantially different from the prior day CT study.


Medications List Reviewed: Yes





Assessment & Plan


Physician Review: Patient Assessed, Agree with Above Assessment and Plan


Physician Review Additional Text: 


Mr. Grimes is 21 y/o male pw cough and sob.


#Severe sepsis 2/2 to pneumonia- Severe sepsis POA. Maintaining BP and MAP > 65


- Tbili & procal elevated. Lactic acid wnl. 


- IV hydration. Maintain perfusion


- Continue azithromycin, cefepime and vanc


- supportive measures. 


#Pneumonia with bacteremia- CXR with bilateral infiltrates consistent with 

viral pneumonia. CT chest against. With infiltrates, also concerning for septic 

embolic. TTE pending .  Leukocytosis and procalcitonin worsen.  Toxic 

granulation on differential.


- GPC in chains and clusters, final culture to result today. 


- still febrile.  Monitor closely.


-COVID19 suspected, patient in isolation.  Swab has been sent.


-Influenza negative.


-Elevated T bili-hemolysis ruled out


-consulted Infectious Disease and pulmonologist for assistance.  Recommendation 

appreciated


# acute respiratory distress-now requiring Ventimask to maintain saturation 

greater than 90%.  


-patient is tachypneic and tachycardic as well.


-low threshold for intubation.  


-hold off NIV pending COVID19 negative


-serial CXR for improvment. 


#Coagulopathy secondary to sepsis-DIC is unlikely due to elevated fibrinogen, 

unless of subclinical DIC.  PT/INR and PTT is prolonged. 


-no significant bleeding.


-tea-colored urine noted. 


-treat underlining etiology.  No indication for reversal of coagulopathy.


-monitor for bleeding 


-discussed with hematologist on call.


#Thrombocytopenia- due to sepsis. Improving. 


#Electrolyte imbalance-hypokalemia and hyponatremia secondary to hypovolemia.


-will replace.  


-monitor closely with daily labs.


#Hyperglycemia-secondary to sepsis.  Patient denies prior history of diabetes.


#Vomiting and diarrhea-now resolved


DVT ppx- SCD


Patient is full code. 


Dispo- inpatient

## 2020-03-27 LAB
ALBUMIN SERPL BCP-MCNC: 1.4 G/DL (ref 3.4–5)
ALP SERPL-CCNC: 127 U/L (ref 45–117)
ALT SERPL W P-5'-P-CCNC: 22 U/L (ref 12–78)
ANISOCYTOSIS BLD QL: (no result)
AST SERPL W P-5'-P-CCNC: 38 U/L (ref 15–37)
BUN BLD-MCNC: 16 MG/DL (ref 7–18)
DOHLE BOD BLD QL SMEAR: PRESENT
GIANT PLATELETS BLD QL SMEAR: PRESENT
GLUCOSE SERPLBLD-MCNC: 88 MG/DL (ref 74–106)
HCT VFR BLD CALC: 29.9 % (ref 39.6–49)
INR BLD: 2.42
LYMPHOCYTES # SPEC AUTO: 1.8 K/UL (ref 0.7–4.9)
MAGNESIUM SERPL-MCNC: 2 MG/DL (ref 1.8–2.4)
MORPHOLOGY BLD-IMP: (no result)
PMV BLD: 11.7 FL (ref 7.6–11.3)
POTASSIUM SERPL-SCNC: 3.9 MMOL/L (ref 3.5–5.1)
RBC # BLD: 3.47 M/UL (ref 4.33–5.43)
TARGETS BLD QL SMEAR: (no result)
TOXIC GRANULES BLD QL SMEAR: (no result)

## 2020-03-27 RX ADMIN — ACETAMINOPHEN SCH: 325 TABLET ORAL at 16:00

## 2020-03-27 RX ADMIN — MORPHINE SULFATE PRN MG: 2 INJECTION, SOLUTION INTRAMUSCULAR; INTRAVENOUS at 23:05

## 2020-03-27 RX ADMIN — MORPHINE SULFATE PRN MG: 2 INJECTION, SOLUTION INTRAMUSCULAR; INTRAVENOUS at 13:25

## 2020-03-27 RX ADMIN — TRAMADOL HYDROCHLORIDE PRN MG: 50 TABLET, COATED ORAL at 08:59

## 2020-03-27 RX ADMIN — FLUCONAZOLE IN SODIUM CHLORIDE SCH MLS: 2 INJECTION, SOLUTION INTRAVENOUS at 09:36

## 2020-03-27 RX ADMIN — MORPHINE SULFATE PRN MG: 2 INJECTION, SOLUTION INTRAMUSCULAR; INTRAVENOUS at 16:23

## 2020-03-27 RX ADMIN — CEFEPIME SCH MLS: 1 INJECTION, POWDER, FOR SOLUTION INTRAMUSCULAR; INTRAVENOUS at 09:09

## 2020-03-27 RX ADMIN — SODIUM CHLORIDE SCH MLS: 9 INJECTION, SOLUTION INTRAVENOUS at 01:43

## 2020-03-27 RX ADMIN — Medication PRN APPL: at 08:09

## 2020-03-27 RX ADMIN — ALBUTEROL SULFATE SCH MG: 2.5 SOLUTION RESPIRATORY (INHALATION) at 14:05

## 2020-03-27 RX ADMIN — GUAIFENESIN AND CODEINE PHOSPHATE PRN ML: 100; 10 SOLUTION ORAL at 07:30

## 2020-03-27 RX ADMIN — ALBUTEROL SULFATE SCH MG: 2.5 SOLUTION RESPIRATORY (INHALATION) at 20:38

## 2020-03-27 RX ADMIN — GUAIFENESIN AND CODEINE PHOSPHATE PRN ML: 100; 10 SOLUTION ORAL at 01:43

## 2020-03-27 RX ADMIN — ACETAMINOPHEN SCH MG: 500 TABLET, FILM COATED ORAL at 16:19

## 2020-03-27 RX ADMIN — ACETAMINOPHEN SCH: 325 TABLET ORAL at 10:00

## 2020-03-27 RX ADMIN — ACETAMINOPHEN SCH MG: 325 TABLET ORAL at 21:41

## 2020-03-27 RX ADMIN — IBUPROFEN PRN MG: 600 TABLET ORAL at 13:34

## 2020-03-27 RX ADMIN — ACETAMINOPHEN SCH MG: 500 TABLET, FILM COATED ORAL at 12:30

## 2020-03-27 RX ADMIN — Medication PRN APPL: at 01:51

## 2020-03-27 RX ADMIN — ACETAMINOPHEN SCH MG: 500 TABLET, FILM COATED ORAL at 09:00

## 2020-03-27 RX ADMIN — GUAIFENESIN AND CODEINE PHOSPHATE PRN ML: 100; 10 SOLUTION ORAL at 22:11

## 2020-03-27 RX ADMIN — CEFEPIME SCH MLS: 1 INJECTION, POWDER, FOR SOLUTION INTRAMUSCULAR; INTRAVENOUS at 20:33

## 2020-03-27 RX ADMIN — Medication PRN APPL: at 12:30

## 2020-03-27 RX ADMIN — ACETAMINOPHEN SCH MG: 325 TABLET ORAL at 03:31

## 2020-03-27 RX ADMIN — SODIUM CHLORIDE SCH MLS: 9 INJECTION, SOLUTION INTRAVENOUS at 13:00

## 2020-03-27 RX ADMIN — MORPHINE SULFATE PRN MG: 2 INJECTION, SOLUTION INTRAMUSCULAR; INTRAVENOUS at 18:56

## 2020-03-27 RX ADMIN — MORPHINE SULFATE PRN MG: 2 INJECTION, SOLUTION INTRAMUSCULAR; INTRAVENOUS at 08:59

## 2020-03-27 NOTE — PN
Subjective:  Patient is in ICU, feels slightly better today, has a spiking temperature of 103 this mo
rning and his white blood cell has also jumped up to 30,000.  The patient continues to be slightly sh
ort of breath on oxygen nasal cannula with a heart rate of 110.



Objective:  Vital Signs:  Blood pressure 127/66. 

Lungs:  Clear. 

Heart:  S1, S2.  Regular. 

Abdomen:  Soft.  Bowel sounds present. 

Extremities:  No edema.



Laboratory Data:  Shows WBC 30,000, hemoglobin 10.1, platelets 123.  Chemistry shows, sodium 136, pot
assium 3.9, chloride 104, bicarb 24, BUN 16, creatinine 0.6, glucose is 88, albumin level is 1.4.



Assessment And Plan:  Respiratory failure in a 20-year-old male with a history of vape, COVID-19 is n
egative.  Cultures are coming negative for now.  We will repeat another set of blood culture, current
ly being treated with empiric antibiotic including cefepime and vancomycin.  Patient is also on fluco
nazole 100 mg daily.  If not improved, we will recommend to switch patient from cefepime to Zosyn to 
increase coverage for anaerobic and we will follow up on culture.  Continue respiratory care and wilmer
tor for signs of infection.  Patient can also be started on ibuprofen if needed to break the fevers.





NF/MODL

DD:  03/27/2020 10:58:10Voice ID:  [id number]

DT:  03/27/2020 11:32:27Report ID:  [id number]

## 2020-03-27 NOTE — ECHO
HEIGHT: 5 ft 7 in   WEIGHT: 179 lb 5 oz   DATE OF STUDY: 3/26/2020   REFER DR: 
Donnell Gill

2-DIMENSIONAL: YES

         M.MODE: YES

     DOPPLER: NO

    COLOR FLOW: NO

    

                        TDS:  NO

    PORTABLE: NO

     DEFINITY:  NO

    BUBBLE STUDY: NO

    

    

DIAGNOSIS:  RULE OUT ENDOCARDITIS, SEPTIC EMBOLI



CARDIAC HISTORY:  

CATHERIZATION: NO

    SURGERY: NO

    PROSTHETIC VALVE: NO

    PACEMAKER: NO

    

    

MEASUREMENTS (cm)

    DIASTOLIC (NORMALS)                 SYSTOLIC (NORMALS)

IVSd                 0.8 (0.6-1.2)                    LA Diam 2.9 (1.9-4.0)     LVEF       
  59%  

LVIDd               5.0 (3.5-5.7)                        LVIDs      3.4 (2.0-3.5)     %FS  
        32%

LVPWd             1.0 (0.6-1.2)

Ao Diam           2.8 (2.0-3.7)



2 DIMENSIONAL ASSESSMENT:

RIGHT ATRIUM:                   NORMAL

    LEFT ATRIUM:       NORMAL

    

    RIGHT VENTRICLE:            NORMAL

    LEFT VENTRICLE: NORMAL

    

    TRICUSPID VALVE:             NORMAL

    MITRAL VALVE:     NORMAL

    

    PULMONIC VALVE:             NORMAL

    AORTIC VALVE:     NORMAL

    

    PERICARDIAL EFFUSION: NONE

    AORTIC ROOT:      NORMAL

    

    

LEFT VENTRICULAR WALL MOTION:     NORMAL.



DOPPLER/COLOR FLOW:     NOT REQUESTED.



COMMENTS:      NORMAL LEFT VENTRICULAR SIZE AND FUNCTION. NO VEGETATION. TRACE OF 
PERICARDIAL EFFUSION.



TECHNOLOGIST:   SUSAN BUNN

## 2020-03-28 VITALS — OXYGEN SATURATION: 97 %

## 2020-03-28 LAB
ALBUMIN SERPL BCP-MCNC: 1.4 G/DL (ref 3.4–5)
ALP SERPL-CCNC: 136 U/L (ref 45–117)
ALT SERPL W P-5'-P-CCNC: 25 U/L (ref 12–78)
ANISOCYTOSIS BLD QL: (no result)
AST SERPL W P-5'-P-CCNC: 42 U/L (ref 15–37)
BUN BLD-MCNC: 14 MG/DL (ref 7–18)
GLUCOSE SERPLBLD-MCNC: 113 MG/DL (ref 74–106)
HCT VFR BLD CALC: 30.3 % (ref 39.6–49)
INR BLD: 1.63
LYMPHOCYTES # SPEC AUTO: 2.2 K/UL (ref 0.7–4.9)
MORPHOLOGY BLD-IMP: (no result)
PMV BLD: 10.2 FL (ref 7.6–11.3)
POTASSIUM SERPL-SCNC: 3.9 MMOL/L (ref 3.5–5.1)
RBC # BLD: 3.48 M/UL (ref 4.33–5.43)

## 2020-03-28 RX ADMIN — MORPHINE SULFATE PRN MG: 2 INJECTION, SOLUTION INTRAMUSCULAR; INTRAVENOUS at 16:18

## 2020-03-28 RX ADMIN — MORPHINE SULFATE PRN MG: 2 INJECTION, SOLUTION INTRAMUSCULAR; INTRAVENOUS at 05:11

## 2020-03-28 RX ADMIN — CEFEPIME SCH MLS: 1 INJECTION, POWDER, FOR SOLUTION INTRAMUSCULAR; INTRAVENOUS at 09:49

## 2020-03-28 RX ADMIN — GUAIFENESIN AND CODEINE PHOSPHATE PRN ML: 100; 10 SOLUTION ORAL at 16:16

## 2020-03-28 RX ADMIN — PIPERACILLIN SODIUM AND TAZOBACTAM SODIUM SCH MLS: 3; .375 INJECTION, POWDER, LYOPHILIZED, FOR SOLUTION INTRAVENOUS at 16:19

## 2020-03-28 RX ADMIN — ALBUMIN (HUMAN) ONE MG: 5 SOLUTION INTRAVENOUS at 17:12

## 2020-03-28 RX ADMIN — ACETAMINOPHEN SCH MG: 325 TABLET ORAL at 16:16

## 2020-03-28 RX ADMIN — PIPERACILLIN SODIUM AND TAZOBACTAM SODIUM SCH MLS: 3; .375 INJECTION, POWDER, LYOPHILIZED, FOR SOLUTION INTRAVENOUS at 10:36

## 2020-03-28 RX ADMIN — ALBUTEROL SULFATE SCH MG: 2.5 SOLUTION RESPIRATORY (INHALATION) at 20:00

## 2020-03-28 RX ADMIN — ACETAMINOPHEN SCH MG: 325 TABLET ORAL at 03:52

## 2020-03-28 RX ADMIN — GUAIFENESIN AND CODEINE PHOSPHATE PRN ML: 100; 10 SOLUTION ORAL at 10:19

## 2020-03-28 RX ADMIN — FLUCONAZOLE IN SODIUM CHLORIDE SCH MLS: 2 INJECTION, SOLUTION INTRAVENOUS at 09:48

## 2020-03-28 RX ADMIN — ALBUTEROL SULFATE SCH MG: 2.5 SOLUTION RESPIRATORY (INHALATION) at 07:30

## 2020-03-28 RX ADMIN — ALBUTEROL SULFATE SCH MG: 2.5 SOLUTION RESPIRATORY (INHALATION) at 02:25

## 2020-03-28 RX ADMIN — ALBUTEROL SULFATE SCH MG: 2.5 SOLUTION RESPIRATORY (INHALATION) at 13:50

## 2020-03-28 RX ADMIN — ALBUMIN (HUMAN) ONE: 5 SOLUTION INTRAVENOUS at 17:20

## 2020-03-28 RX ADMIN — ACETAMINOPHEN SCH MG: 325 TABLET ORAL at 23:19

## 2020-03-28 RX ADMIN — SODIUM CHLORIDE SCH MLS: 9 INJECTION, SOLUTION INTRAVENOUS at 13:57

## 2020-03-28 RX ADMIN — IBUPROFEN PRN MG: 600 TABLET ORAL at 04:49

## 2020-03-28 RX ADMIN — ACETAMINOPHEN SCH MG: 325 TABLET ORAL at 09:49

## 2020-03-28 RX ADMIN — SODIUM CHLORIDE SCH MLS: 9 INJECTION, SOLUTION INTRAVENOUS at 00:19

## 2020-03-28 RX ADMIN — MORPHINE SULFATE PRN MG: 2 INJECTION, SOLUTION INTRAMUSCULAR; INTRAVENOUS at 10:18

## 2020-03-28 RX ADMIN — MORPHINE SULFATE PRN MG: 2 INJECTION, SOLUTION INTRAMUSCULAR; INTRAVENOUS at 21:10

## 2020-03-28 RX ADMIN — MORPHINE SULFATE PRN MG: 2 INJECTION, SOLUTION INTRAMUSCULAR; INTRAVENOUS at 23:20

## 2020-03-28 NOTE — RAD REPORT
EXAM DESCRIPTION:  RAD - Chest Single View - 3/28/2020 6:08 am

 

CLINICAL HISTORY:  Pneumonia

Chest pain.

 

COMPARISON:  Chest Single View dated 3/26/2020; Chest Single View dated 3/24/2020

 

FINDINGS:  Portable technique limits examination quality.

 

Mild worsening is seen in lung aeration compared to 03/26/2020 study. Loculated pleural fluid is pres
ent bilaterally, mildly progressive. The heart is normal in size. No displaced fractures.

## 2020-03-28 NOTE — RAD REPORT
EXAM DESCRIPTION:  CT - Chest Abdomen Pelvis W Cont - 3/28/2020 11:20 am

 

CLINICAL HISTORY:  Chest and abdomen pain.

CT chest, abdomen, pelvis with contrast

 

COMPARISON:  Chest For Pe Angio dated 3/25/2020

 

TECHNIQUE:  Approximately 100 mL nonionic IV contrast was administered to the patient.

 

All CT scans are performed using dose optimization technique as appropriate and may include automated
 exposure control or mA/KV adjustment according to patient size.

 

FINDINGS:  Moderate loculated pleural effusions are present bilaterally.Airspace opacities are presen
t in the lung bases with internal areas diminished density seen. Several of the infiltrates have a no
dular appearance with areas of internal lucency. This may indicate a cavitary pneumonia, septic embol
i or related to tuberculosis.No pericardial fluid.No intrathoracic adenopathy.

 

The liver, spleen, pancreas, adrenal glands and kidneys are within normal limits.

 

No bowel obstruction, free air, free fluid or abscess. Normal appendix.  No pathologic lymphadenopath
y in the abdomen or pelvis.

 

No worrisome osseous finding.

 

IMPRESSION:  Moderate bilateral loculated pleural effusions are present with bilateral areas of airsp
ace in consolidation and nodular opacities with internal areas of hypodensity seen.Findings may indic
ate a cavitary type pneumonia, tuberculosis or septic emboli. Overall, findings appear mildly progres
sive since 03/25/2020 study.

## 2020-03-28 NOTE — P.PN
Subjective


Date of Service: 04/11/20


Chief Complaint: Pneumonia





Patient is feeling better CPAP is helping still febrile white count is 

significantly elevated





Review of Systems


General: Weakness


Respiratory: Cough, Shortness of Breath


Cardiovascular: Chest Pain





Physical Examination





- Vital Signs


Temperature: 98.5 F


Blood Pressure: 122/67


Pulse: 95


Respirations: 34


Pulse Ox (%): 100





- Physical Exam


General: Alert, Oriented x3


Respiratory: Diminished (Diminished air entry bilaterally with bilateral basilar

crackle)


Cardiovascular: No edema, Regular rate/rhythm





- Studies


Microbiology Data (last 24 hrs): 








03/24/20 13:20   Blood  - Blood   Blood Culture Gram Stain - Final


03/24/20 13:40   Blood  - Blood   Aerobic Blood Culture - Final


03/24/20 13:40   Blood  - Blood   Blood Culture Gram Stain - Final





Medications List Reviewed: Yes





Assessment & Plan





- Problems (Diagnosis)


(1) Pneumonia


Status: Acute   


Plan: 


Patient is improving although he has white count is significantly elevated 

loculated pleural effusion on the left side most likely it is infected he will 

need to be transferred to a tertiary care unit for decortication white count is 

increasing is currently 33,000 blood cultures positive but is not pneumococcus


Qualifiers: 


   Pneumonia type: due to unspecified organism 


Physician Review: Patient Assessed, Agree with Above Assessment and Plan

## 2020-03-28 NOTE — P.PN
Subjective


Date of Service: 03/28/20


Chief Complaint: Pneumonia





remains febrile. pleuritic chest pain. denies any new complaints.





Physical Examination





- Vital Signs


Temperature: 98.5 F


Blood Pressure: 122/67


Pulse: 95


Respirations: 34


Pulse Ox (%): 100





- Physical Exam


General: Alert, In no apparent distress


HEENT: Atraumatic, PERRLA, EOMI


Neck: Supple, JVD not distended


Respiratory: Diminished, Dull, Crackles/rales


Cardiovascular: Normal S1 S2, Irregular heart rate/rhythm


Gastrointestinal: Normal bowel sounds, No tenderness


Musculoskeletal: No tenderness


Integumentary: No rashes


Neurological: Normal speech, Normal tone, Normal affect


Lymphatics: No axilla or inguinal lymphadenopathy





- Studies





 Laboratory Tests











  03/28/20 03/28/20





  08:44 08:44


 


WBC  33.5 H* 


 


RBC  3.48 L 


 


Hgb  10.3 L 


 


Hct  30.3 L 


 


Plt Count  263  D 


 


Basophils %  0.4 


 


Sodium   138


 


Potassium   3.9


 


Carbon Dioxide   24


 


BUN   14


 


Creatinine   0.53 L


 


Calcium   7.7 L


 


Serum Total Protein   5.9 L











Microbiology Data (last 24 hrs): 








03/24/20 13:20   Blood  - Blood   Blood Culture Gram Stain - Final


03/24/20 13:40   Blood  - Blood   Aerobic Blood Culture - Final


03/24/20 13:40   Blood  - Blood   Blood Culture Gram Stain - Final





Medications List Reviewed: Yes





Assessment & Plan


Physician Review: Patient Assessed, Agree with Above Assessment and Plan


Physician Review Additional Text: 


Mr. Grimes is 21 y/o male pw cough and sob.


#Severe sepsis 2/2 to pneumonia- Severe sepsis POA. Maintaining BP and MAP > 65


- Tbili & procal elevated. Lactic acid wnl. 


- IV hydration. Maintain perfusion


- Continue supportive measures. 


#Pneumonia with bacteremia- CT chest with significant infiltrates, also 

concerning for septic embolic. TTE neg for vegetations. Leukocytosis worsen and 

procalcitonin improving.  Toxic granulation on differential.


-repeat CT chest today with loculated pleural effusion with progressive 

infiltrates. Persistent fever and leukocytosis due to possible empyema. will 

need drainage, no radiologist on call, will transfer for higher level of care


-GPC in chains and clusters, still awaiting final result; repeat bcx is neg.


-Revised abx- zosyn & Vanc.


-COVID19 ruled out.


-Influenza negative.


-Elevated T bili-hemolysis ruled out


-consulted Infectious Disease and pulmonologist for assistance.  Recommendation 

appreciated


#Acute respiratory distress-Due to pnuemonia. BIPAP PRN 


-patient is tachypneic and tachycardic as well.


-low threshold for intubation.  


-Duoneb, pulm toileting.


-serial CXR as needed. 


-bilateral pleural effusion suggestive of empyema


#Coagulopathy secondary to sepsis-DIC is unlikely due to elevated fibrinogen, 

unless of subclinical DIC.  PT/INR and PTT is prolonged. 


-no significant bleeding.


-tea-colored urine noted. Hematuria ruled out


-treat underlining etiology.  No indication for reversal of coagulopathy.


-monitor for bleeding 


-discussed with hematologist on call.


-improving


#Thrombocytopenia- due to sepsis. Improving. 


#Electrolyte imbalance-hypokalemia and hyponatremia secondary to hypovolemia.


-will replace.  


-monitor closely with daily labs.


#Hyperglycemia-secondary to sepsis.  Patient denies prior history of diabetes.


#Vomiting and diarrhea-now resolved





DVT ppx- initiate lovenox.


Patient is full code. 


Dispo- transfer initiated to [facility] for treatment of loculated pleural 

effusion.

## 2020-03-29 NOTE — P.DS
Admission Date: 03/24/20


Discharge Date: 03/29/20


Disposition: TRANSFER TO [facility]


Discharge Condition: CRITICAL


Reason for Admission: Pneumonia


Consultations: 





Pulmonologist


Hematologist


Infectious Disease


Brief History of Present Illness: 





Admission diagnosis-


severe sepsis present on admission secondary to suspected viral pneumonia


Viral pneumonia


Acute respiratory failure with hypoxia


Thrombocytopenia and lymphopenia


Electrolyte imbalance


Hyperglycemia


Vomiting and diarrhea





Discharge diagnosis-severe sepsis secondary to pneumonia present on admission


Complicated Pneumonia with bacteremia


Acute respiratory distress with bilateral loculated pleural effusion


Coagulopathy secondary to sepsis


Thrombocytopenia


Electrolyte imbalance 


hyperglycemia


Vomiting and diarrhea


Hospital Course: 


Mr. Grimes is 20-year-old male with no significant medical history who presented 

to the hospital with complaints of shortness of breath and cough.  Patient 

reported positive influenza test 7 days prior to presentation.  He also 

reported subjective fever, nausea, vomiting and diarrhea.  He denied any 

contact with a COVID positive individual or sick contacts.  Upon presentation 

to the hospital, patient was tachypneic, tachycardic and in respiratory 

distress. He was placed in ICU droplet isolation and COVID19 tests was done, 

and negative.


Initial evaluation with chest x-ray shows bilateral infiltrates, suspicious of 

viral pneumonia.  Further testing with CT chest showed bilateral infiltrate 

with small pleural effusion, consistent with pneumonia & unlikely to be viral 

pneumonia.  He was continued on IV antibiotics, supplemental oxygen and 

pulmonary toileting.  Patient continued to have increased oxygen requirement 

despite treatment.  He remained in severe sepsis with coagulopathy secondary to 

sepsis.  DIC was ruled out. Blood culture grew gram-positive coccus in chains 

and clusters, preliminary report.  Final report is pending.  Repeat culture 

however showed no growth in 24 hr.


Repeat CT chest demonstrated worsening of the pleural effusion, concerning for 

loculated effusion.  Patient's  leukocytosis continued to worsen and he 

remained febrile. Given loculated pleural effusion, possible empyema, patient 

has been accepted to [facility] for further evaluation and 

treatment.


He remained hemodynamically stable for transfer.  


Vital Signs/Physical Exam: 














Temp Pulse Resp BP Pulse Ox


 


 101.4 F H  113 H  31 H  117/60   100 


 


 03/28/20 23:19  03/28/20 23:00  03/28/20 23:20  03/28/20 22:00  03/28/20 23:20








Laboratory Data at Discharge: 














WBC  33.5 K/uL (4.3-10.9)  H*  03/28/20  08:44    


 


Hgb  10.3 g/dL (13.6-17.9)  L  03/28/20  08:44    


 


Hct  30.3 % (39.6-49.0)  L  03/28/20  08:44    


 


Plt Count  263 K/uL (152-406)  D 03/28/20  08:44    


 


PT  19.0 SECONDS (9.5-12.5)  H  03/28/20  04:58    


 


INR  1.63   03/28/20  04:58    


 


APTT  29.5 SECONDS (24.3-36.9)   03/28/20  04:58    


 


Sodium  138 mmol/L (136-145)   03/28/20  08:44    


 


Potassium  3.9 mmol/L (3.5-5.1)   03/28/20  08:44    


 


BUN  14 mg/dL (7-18)   03/28/20  08:44    


 


Creatinine  0.53 mg/dL (0.55-1.3)  L  03/28/20  08:44    


 


Glucose  113 mg/dL ()  H  03/28/20  08:44    


 


Magnesium  1.9 mg/dL (1.8-2.4)   03/28/20  04:58    


 


Total Bilirubin  2.0 mg/dL (0.2-1.0)  H  03/28/20  08:44    


 


AST  42 U/L (15-37)  H  03/28/20  08:44    


 


ALT  25 U/L (12-78)   03/28/20  08:44    


 


Alkaline Phosphatase  136 U/L ()  H  03/28/20  08:44    


 


Triglycerides  299 mg/dL (<150)  H  03/25/20  07:09    


 


Cholesterol  84 mg/dL (<200)   03/25/20  07:09    


 


HDL Cholesterol  11 mg/dL (40-60)  L  03/25/20  07:09    


 


Cholesterol/HDL Ratio  7.64   03/25/20  07:09    


 


Lipase  52 U/L ()  L  03/24/20  12:30    








Home Medications: 








NK [No Home Meds]  03/24/20 





Patient Discharge Instructions: Transfer to [facility] for higher level of care 

as patient needs a CT surgeon


Diet: Regular


Activity: Bedrest

## 2020-04-11 VITALS — DIASTOLIC BLOOD PRESSURE: 67 MMHG | TEMPERATURE: 98.5 F | SYSTOLIC BLOOD PRESSURE: 122 MMHG

## 2024-09-16 NOTE — RAD REPORT
0.2 EXAM DESCRIPTION:  RAD - Chest Single View - 3/28/2020 5:21 pm

 

CLINICAL HISTORY:  R/O Pneumothorax, sudden onset dyspnea, tachypnea

Chest pain.

 

COMPARISON:  Chest Single View dated 3/28/2020; Chest Single View dated 3/26/2020; Chest Single View 
dated 3/24/2020

 

FINDINGS:  Portable technique limits examination quality.

 

Bilateral pleural effusions and bilateral pulmonary opacities appears essentially unchanged since sin
ce comparative study. No evidence of pneumothorax. The heart is normal in size. No displaced fracture
s.

 

IMPRESSION:  No measurable pneumothorax seen. Attending Only